# Patient Record
Sex: MALE | Race: WHITE | NOT HISPANIC OR LATINO | Employment: FULL TIME | URBAN - METROPOLITAN AREA
[De-identification: names, ages, dates, MRNs, and addresses within clinical notes are randomized per-mention and may not be internally consistent; named-entity substitution may affect disease eponyms.]

---

## 2023-04-01 ENCOUNTER — HOSPITAL ENCOUNTER (EMERGENCY)
Facility: HOSPITAL | Age: 38
Discharge: HOME | End: 2023-04-01
Attending: STUDENT IN AN ORGANIZED HEALTH CARE EDUCATION/TRAINING PROGRAM

## 2023-04-01 VITALS
OXYGEN SATURATION: 97 % | BODY MASS INDEX: 28.7 KG/M2 | HEIGHT: 71 IN | RESPIRATION RATE: 18 BRPM | SYSTOLIC BLOOD PRESSURE: 120 MMHG | HEART RATE: 76 BPM | TEMPERATURE: 97 F | WEIGHT: 205 LBS | DIASTOLIC BLOOD PRESSURE: 87 MMHG

## 2023-04-01 DIAGNOSIS — J02.9 SORE THROAT: ICD-10-CM

## 2023-04-01 DIAGNOSIS — J02.0 STREP PHARYNGITIS: Primary | ICD-10-CM

## 2023-04-01 LAB
FLUAV AG UPPER RESP QL IA.RAPID: NOT DETECTED
FLUBV AG UPPER RESP QL IA.RAPID: NOT DETECTED
SARS-COV-2 RNA RESP QL NAA+PROBE: NOT DETECTED
STREP A PCR (OHS): DETECTED

## 2023-04-01 PROCEDURE — 63600175 PHARM REV CODE 636 W HCPCS: Performed by: PHYSICIAN ASSISTANT

## 2023-04-01 PROCEDURE — 0240U COVID/FLU A&B PCR: CPT | Performed by: STUDENT IN AN ORGANIZED HEALTH CARE EDUCATION/TRAINING PROGRAM

## 2023-04-01 PROCEDURE — 25000003 PHARM REV CODE 250: Performed by: PHYSICIAN ASSISTANT

## 2023-04-01 PROCEDURE — 99284 EMERGENCY DEPT VISIT MOD MDM: CPT

## 2023-04-01 PROCEDURE — 96372 THER/PROPH/DIAG INJ SC/IM: CPT | Performed by: PHYSICIAN ASSISTANT

## 2023-04-01 PROCEDURE — 87651 STREP A DNA AMP PROBE: CPT | Performed by: STUDENT IN AN ORGANIZED HEALTH CARE EDUCATION/TRAINING PROGRAM

## 2023-04-01 RX ORDER — AMOXICILLIN AND CLAVULANATE POTASSIUM 875; 125 MG/1; MG/1
1 TABLET, FILM COATED ORAL 2 TIMES DAILY
Qty: 20 TABLET | Refills: 0 | Status: SHIPPED | OUTPATIENT
Start: 2023-04-01 | End: 2023-04-01 | Stop reason: SDUPTHER

## 2023-04-01 RX ORDER — AMOXICILLIN AND CLAVULANATE POTASSIUM 875; 125 MG/1; MG/1
1 TABLET, FILM COATED ORAL 2 TIMES DAILY
Qty: 20 TABLET | Refills: 0 | Status: SHIPPED | OUTPATIENT
Start: 2023-04-01 | End: 2023-04-11

## 2023-04-01 RX ORDER — CEFTRIAXONE 1 G/1
1 INJECTION, POWDER, FOR SOLUTION INTRAMUSCULAR; INTRAVENOUS
Status: COMPLETED | OUTPATIENT
Start: 2023-04-01 | End: 2023-04-01

## 2023-04-01 RX ORDER — LIDOCAINE HYDROCHLORIDE 10 MG/ML
5 INJECTION INFILTRATION; PERINEURAL
Status: COMPLETED | OUTPATIENT
Start: 2023-04-01 | End: 2023-04-01

## 2023-04-01 RX ADMIN — CEFTRIAXONE SODIUM 1 G: 1 INJECTION, POWDER, FOR SOLUTION INTRAMUSCULAR; INTRAVENOUS at 03:04

## 2023-04-01 RX ADMIN — LIDOCAINE HYDROCHLORIDE 5 ML: 10 INJECTION, SOLUTION INFILTRATION; PERINEURAL at 03:04

## 2023-04-01 NOTE — ED PROVIDER NOTES
Encounter Date: 4/1/2023       History     Chief Complaint   Patient presents with    Sore Throat     Pt c/o having a sorethroat onset 6 days ago.       Sore Throat   This is a new problem. The current episode started several days ago. The problem has been unchanged. The maximum temperature recorded prior to his arrival was 101 - 101.9 F. Associated symptoms include congestion. Pertinent negatives include no coughing, ear discharge, ear pain or shortness of breath. He has had exposure to strep. He has tried acetaminophen and NSAIDs for the symptoms. The treatment provided no relief.   Review of patient's allergies indicates:  No Known Allergies  History reviewed. No pertinent past medical history.  History reviewed. No pertinent surgical history.  No family history on file.     Review of Systems   Constitutional:  Negative for fever.   HENT:  Positive for congestion and sore throat. Negative for ear discharge and ear pain.    Respiratory:  Negative for cough and shortness of breath.    Cardiovascular:  Negative for chest pain.   Gastrointestinal:  Negative for nausea.   Genitourinary:  Negative for dysuria.   Musculoskeletal:  Negative for back pain.   Skin:  Negative for rash.   Neurological:  Negative for weakness.   Hematological:  Does not bruise/bleed easily.     Physical Exam     Initial Vitals [04/01/23 1403]   BP Pulse Resp Temp SpO2   120/87 76 18 97.2 °F (36.2 °C) 97 %      MAP       --         Physical Exam    Vitals reviewed.  Constitutional: He appears well-developed.   HENT:   Right Ear: Tympanic membrane normal. Tympanic membrane is not retracted and not bulging.   Left Ear: Tympanic membrane normal. Tympanic membrane is not retracted and not bulging.   Cardiovascular:  Normal rate.           Pulmonary/Chest: Breath sounds normal. He has no wheezes. He has no rales.     Neurological: He is alert and oriented to person, place, and time. He has normal strength.   Skin: Skin is warm.   Psychiatric: He  has a normal mood and affect. His behavior is normal. Thought content normal.       ED Course   Procedures  Labs Reviewed   STREP GROUP A BY PCR - Abnormal; Notable for the following components:       Result Value    STREP A PCR ([facility]) Detected (*)     All other components within normal limits    Narrative:     The Xpert Xpress Strep A test is a rapid, qualitative in vitro diagnostic test for the detection of Streptococcus pyogenes (Group A ß-hemolytic Streptococcus, Strep A) in throat swab specimens from patients with signs and symptoms of pharyngitis.     COVID/FLU A&B PCR - Normal    Narrative:     The Xpert Xpress SARS-CoV-2/FLU/RSV plus is a rapid, multiplexed real-time PCR test intended for the simultaneous qualitative detection and differentiation of SARS-CoV-2, Influenza A, Influenza B, and respiratory syncytial virus (RSV) viral RNA in either nasopharyngeal swab or nasal swab specimens.                Imaging Results    None          Medications   cefTRIAXone injection 1 g (has no administration in time range)   LIDOcaine HCL 10 mg/ml (1%) injection 5 mL (has no administration in time range)     Medical Decision Making:   Initial Assessment:   38 year old male complains of  a current episode started several days ago. The problem has been unchanged. The maximum temperature recorded prior to his arrival was 101 - 101.9 F. Associated symptoms include congestion. Pertinent negatives include no coughing, ear discharge, ear pain or shortness of breath. He has had exposure to strep. He has tried acetaminophen and NSAIDs for the symptoms. The treatment provided no relief.   Differential Diagnosis:   Judging by the patient's chief complaint and pertinent history, the patient has the following possible differential diagnoses, including but not limited to the following.  Some of these are deemed to be lower likelihood and some more likely based on my physical exam and history combined with possible lab work and/or  imaging studies.   Please see the pertinent studies, and refer to the HPI.  Some of these diagnoses will take further evaluation to fully rule out, perhaps as an outpatient and the patient was encouraged to follow up when discharged for more comprehensive evaluation.    Viral syndrome, COVID, flu, bacterial pharyngitis, mononucleosis, GC/chlamydia, retroperitoneal abscess, epiglottitis, jyothi's angina, peritonsillar abscess, HIV, candidiasis, bacterial tracheitis, foreign body   Clinical Tests:   Lab Tests: Reviewed       <> Summary of Lab: Positive for strep  ED Management:  IM Rocephin, prescription for antibiotics instructed patient and to rotate Tylenol and ibuprofen as needed           ED Course as of 04/01/23 1535   Sat Apr 01, 2023   1515 STREP A PCR ([facility])(!): Detected [YG]   1515 STREP A PCR ([facility])(!): Detected [YG]   1532 Influenza A, Molecular: Not Detected [YG]   1532 Influenza B, Molecular: Not Detected [YG]   1532 SARS-CoV2 (COVID-19) Qualitative PCR: Not Detected [YG]      ED Course User Index  [YG] JUDD Brownlee                 Clinical Impression:   Final diagnoses:  [J02.0] Strep pharyngitis (Primary)  [J02.9] Sore throat        ED Disposition Condition    Discharge Stable          ED Prescriptions       Medication Sig Dispense Start Date End Date Auth. Provider    amoxicillin-clavulanate 875-125mg (AUGMENTIN) 875-125 mg per tablet Take 1 tablet by mouth 2 (two) times daily. for 10 days 20 tablet 4/1/2023 4/11/2023 JUDD Brownlee          Follow-up Information       Follow up With Specialties Details Why Contact Info    [facility] - Emergency Dept Emergency Medicine  If symptoms worsen [id number]  [facility] [id number] [phone]             JUDD Brownlee  04/01/23 1092

## 2023-04-01 NOTE — Clinical Note
"David Mcrae" Katherine was seen and treated in our emergency department on 4/1/2023.  He may return to work on 04/04/2023.       If you have any questions or concerns, please don't hesitate to call.      NAOMY Nelson RN RN    "